# Patient Record
Sex: MALE | Race: WHITE | NOT HISPANIC OR LATINO | Employment: STUDENT | URBAN - METROPOLITAN AREA
[De-identification: names, ages, dates, MRNs, and addresses within clinical notes are randomized per-mention and may not be internally consistent; named-entity substitution may affect disease eponyms.]

---

## 2018-12-11 ENCOUNTER — ANESTHESIA EVENT (OUTPATIENT)
Dept: SURGERY | Facility: OTHER | Age: 23
End: 2018-12-11
Payer: COMMERCIAL

## 2018-12-11 ENCOUNTER — HOSPITAL ENCOUNTER (OUTPATIENT)
Facility: OTHER | Age: 23
Discharge: HOME | End: 2018-12-11
Attending: EMERGENCY MEDICINE | Admitting: EMERGENCY MEDICINE
Payer: COMMERCIAL

## 2018-12-11 ENCOUNTER — ANESTHESIA (OUTPATIENT)
Dept: SURGERY | Facility: OTHER | Age: 23
End: 2018-12-11
Payer: COMMERCIAL

## 2018-12-11 VITALS
TEMPERATURE: 99 F | SYSTOLIC BLOOD PRESSURE: 139 MMHG | WEIGHT: 200 LBS | OXYGEN SATURATION: 100 % | RESPIRATION RATE: 16 BRPM | BODY MASS INDEX: 24.36 KG/M2 | DIASTOLIC BLOOD PRESSURE: 83 MMHG | HEART RATE: 74 BPM | HEIGHT: 76 IN

## 2018-12-11 DIAGNOSIS — S61.419A HAND LACERATION: ICD-10-CM

## 2018-12-11 LAB
ABO + RH BLD: NORMAL
ALBUMIN SERPL BCP-MCNC: 4.2 G/DL
ALP SERPL-CCNC: 60 U/L
ALT SERPL W/O P-5'-P-CCNC: 21 U/L
ANION GAP SERPL CALC-SCNC: 7 MMOL/L
AST SERPL-CCNC: 21 U/L
BASOPHILS # BLD AUTO: 0.02 K/UL
BASOPHILS NFR BLD: 0.4 %
BILIRUB SERPL-MCNC: 0.9 MG/DL
BLD GP AB SCN CELLS X3 SERPL QL: NORMAL
BUN SERPL-MCNC: 14 MG/DL
CALCIUM SERPL-MCNC: 9.9 MG/DL
CHLORIDE SERPL-SCNC: 106 MMOL/L
CO2 SERPL-SCNC: 27 MMOL/L
CREAT SERPL-MCNC: 0.9 MG/DL
DIFFERENTIAL METHOD: NORMAL
EOSINOPHIL # BLD AUTO: 0.1 K/UL
EOSINOPHIL NFR BLD: 1.3 %
ERYTHROCYTE [DISTWIDTH] IN BLOOD BY AUTOMATED COUNT: 12.5 %
EST. GFR  (AFRICAN AMERICAN): >60 ML/MIN/1.73 M^2
EST. GFR  (NON AFRICAN AMERICAN): >60 ML/MIN/1.73 M^2
GLUCOSE SERPL-MCNC: 102 MG/DL
HCT VFR BLD AUTO: 42 %
HGB BLD-MCNC: 14 G/DL
LYMPHOCYTES # BLD AUTO: 1.2 K/UL
LYMPHOCYTES NFR BLD: 20.9 %
MCH RBC QN AUTO: 29.5 PG
MCHC RBC AUTO-ENTMCNC: 33.3 G/DL
MCV RBC AUTO: 89 FL
MONOCYTES # BLD AUTO: 0.4 K/UL
MONOCYTES NFR BLD: 7.9 %
NEUTROPHILS # BLD AUTO: 3.9 K/UL
NEUTROPHILS NFR BLD: 69.3 %
PLATELET # BLD AUTO: 236 K/UL
PMV BLD AUTO: 9.9 FL
POTASSIUM SERPL-SCNC: 4.3 MMOL/L
PROT SERPL-MCNC: 7.2 G/DL
RBC # BLD AUTO: 4.74 M/UL
SODIUM SERPL-SCNC: 140 MMOL/L
WBC # BLD AUTO: 5.56 K/UL

## 2018-12-11 PROCEDURE — 36000706: Performed by: ORTHOPAEDIC SURGERY

## 2018-12-11 PROCEDURE — 63600175 PHARM REV CODE 636 W HCPCS: Performed by: NURSE ANESTHETIST, CERTIFIED REGISTERED

## 2018-12-11 PROCEDURE — 96365 THER/PROPH/DIAG IV INF INIT: CPT

## 2018-12-11 PROCEDURE — 80053 COMPREHEN METABOLIC PANEL: CPT

## 2018-12-11 PROCEDURE — 63600175 PHARM REV CODE 636 W HCPCS: Performed by: EMERGENCY MEDICINE

## 2018-12-11 PROCEDURE — 36415 COLL VENOUS BLD VENIPUNCTURE: CPT

## 2018-12-11 PROCEDURE — 99285 EMERGENCY DEPT VISIT HI MDM: CPT | Mod: 25

## 2018-12-11 PROCEDURE — 25000003 PHARM REV CODE 250: Performed by: NURSE ANESTHETIST, CERTIFIED REGISTERED

## 2018-12-11 PROCEDURE — 36000707: Performed by: ORTHOPAEDIC SURGERY

## 2018-12-11 PROCEDURE — 85025 COMPLETE CBC W/AUTO DIFF WBC: CPT

## 2018-12-11 PROCEDURE — 37000008 HC ANESTHESIA 1ST 15 MINUTES: Performed by: ORTHOPAEDIC SURGERY

## 2018-12-11 PROCEDURE — 71000015 HC POSTOP RECOV 1ST HR: Performed by: ORTHOPAEDIC SURGERY

## 2018-12-11 PROCEDURE — 37000009 HC ANESTHESIA EA ADD 15 MINS: Performed by: ORTHOPAEDIC SURGERY

## 2018-12-11 PROCEDURE — 12041 INTMD RPR N-HF/GENIT 2.5CM/<: CPT | Mod: F3

## 2018-12-11 PROCEDURE — 86901 BLOOD TYPING SEROLOGIC RH(D): CPT

## 2018-12-11 PROCEDURE — G0378 HOSPITAL OBSERVATION PER HR: HCPCS

## 2018-12-11 PROCEDURE — 96367 TX/PROPH/DG ADDL SEQ IV INF: CPT

## 2018-12-11 PROCEDURE — 25000003 PHARM REV CODE 250: Performed by: PHYSICIAN ASSISTANT

## 2018-12-11 PROCEDURE — 27800903 OPTIME MED/SURG SUP & DEVICES OTHER IMPLANTS: Performed by: ORTHOPAEDIC SURGERY

## 2018-12-11 PROCEDURE — 63600175 PHARM REV CODE 636 W HCPCS: Performed by: ANESTHESIOLOGY

## 2018-12-11 DEVICE — IMPLANTABLE DEVICE: Type: IMPLANTABLE DEVICE | Site: HAND | Status: FUNCTIONAL

## 2018-12-11 RX ORDER — SODIUM CHLORIDE, SODIUM LACTATE, POTASSIUM CHLORIDE, CALCIUM CHLORIDE 600; 310; 30; 20 MG/100ML; MG/100ML; MG/100ML; MG/100ML
INJECTION, SOLUTION INTRAVENOUS CONTINUOUS PRN
Status: DISCONTINUED | OUTPATIENT
Start: 2018-12-11 | End: 2018-12-11

## 2018-12-11 RX ORDER — CEPHALEXIN 500 MG/1
500 CAPSULE ORAL 4 TIMES DAILY
Qty: 20 CAPSULE | Refills: 0 | Status: SHIPPED | OUTPATIENT
Start: 2018-12-11 | End: 2018-12-16

## 2018-12-11 RX ORDER — OXYCODONE HYDROCHLORIDE 5 MG/1
5 TABLET ORAL
Status: DISCONTINUED | OUTPATIENT
Start: 2018-12-11 | End: 2018-12-11 | Stop reason: HOSPADM

## 2018-12-11 RX ORDER — FENTANYL CITRATE 50 UG/ML
INJECTION, SOLUTION INTRAMUSCULAR; INTRAVENOUS
Status: DISCONTINUED | OUTPATIENT
Start: 2018-12-11 | End: 2018-12-11

## 2018-12-11 RX ORDER — PROPOFOL 10 MG/ML
VIAL (ML) INTRAVENOUS CONTINUOUS PRN
Status: DISCONTINUED | OUTPATIENT
Start: 2018-12-11 | End: 2018-12-11

## 2018-12-11 RX ORDER — HYDROMORPHONE HYDROCHLORIDE 2 MG/ML
0.4 INJECTION, SOLUTION INTRAMUSCULAR; INTRAVENOUS; SUBCUTANEOUS EVERY 5 MIN PRN
Status: DISCONTINUED | OUTPATIENT
Start: 2018-12-11 | End: 2018-12-11 | Stop reason: HOSPADM

## 2018-12-11 RX ORDER — LIDOCAINE HCL/PF 100 MG/5ML
SYRINGE (ML) INTRAVENOUS
Status: DISCONTINUED | OUTPATIENT
Start: 2018-12-11 | End: 2018-12-11

## 2018-12-11 RX ORDER — ONDANSETRON 2 MG/ML
4 INJECTION INTRAMUSCULAR; INTRAVENOUS DAILY PRN
Status: DISCONTINUED | OUTPATIENT
Start: 2018-12-11 | End: 2018-12-11 | Stop reason: HOSPADM

## 2018-12-11 RX ORDER — MIDAZOLAM HYDROCHLORIDE 1 MG/ML
2 INJECTION INTRAMUSCULAR; INTRAVENOUS
Status: DISCONTINUED | OUTPATIENT
Start: 2018-12-11 | End: 2018-12-11 | Stop reason: HOSPADM

## 2018-12-11 RX ORDER — DIPHENHYDRAMINE HYDROCHLORIDE 50 MG/ML
12.5 INJECTION INTRAMUSCULAR; INTRAVENOUS EVERY 30 MIN PRN
Status: DISCONTINUED | OUTPATIENT
Start: 2018-12-11 | End: 2018-12-11

## 2018-12-11 RX ORDER — HYDROCODONE BITARTRATE AND ACETAMINOPHEN 5; 325 MG/1; MG/1
1 TABLET ORAL EVERY 4 HOURS PRN
Status: DISCONTINUED | OUTPATIENT
Start: 2018-12-11 | End: 2018-12-11 | Stop reason: HOSPADM

## 2018-12-11 RX ORDER — ACETAMINOPHEN 10 MG/ML
1000 INJECTION, SOLUTION INTRAVENOUS EVERY 8 HOURS
Status: DISCONTINUED | OUTPATIENT
Start: 2018-12-11 | End: 2018-12-11

## 2018-12-11 RX ORDER — ROPIVACAINE HYDROCHLORIDE 5 MG/ML
INJECTION, SOLUTION EPIDURAL; INFILTRATION; PERINEURAL
Status: COMPLETED | OUTPATIENT
Start: 2018-12-11 | End: 2018-12-11

## 2018-12-11 RX ORDER — MEPERIDINE HYDROCHLORIDE 50 MG/ML
12.5 INJECTION INTRAMUSCULAR; INTRAVENOUS; SUBCUTANEOUS ONCE AS NEEDED
Status: DISCONTINUED | OUTPATIENT
Start: 2018-12-11 | End: 2018-12-11 | Stop reason: HOSPADM

## 2018-12-11 RX ORDER — HYDROCODONE BITARTRATE AND ACETAMINOPHEN 5; 325 MG/1; MG/1
1 TABLET ORAL EVERY 4 HOURS PRN
Qty: 20 TABLET | Refills: 0 | Status: SHIPPED | OUTPATIENT
Start: 2018-12-11

## 2018-12-11 RX ORDER — ACETAMINOPHEN 10 MG/ML
1000 INJECTION, SOLUTION INTRAVENOUS EVERY 8 HOURS
Status: DISCONTINUED | OUTPATIENT
Start: 2018-12-11 | End: 2018-12-11 | Stop reason: HOSPADM

## 2018-12-11 RX ORDER — DIPHENHYDRAMINE HCL 25 MG
CAPSULE ORAL
Status: DISCONTINUED | OUTPATIENT
Start: 2018-12-11 | End: 2018-12-11

## 2018-12-11 RX ORDER — CEFAZOLIN SODIUM 1 G/50ML
1 SOLUTION INTRAVENOUS
Status: COMPLETED | OUTPATIENT
Start: 2018-12-11 | End: 2018-12-11

## 2018-12-11 RX ORDER — FENTANYL CITRATE 50 UG/ML
100 INJECTION, SOLUTION INTRAMUSCULAR; INTRAVENOUS EVERY 5 MIN PRN
Status: DISCONTINUED | OUTPATIENT
Start: 2018-12-11 | End: 2018-12-11 | Stop reason: HOSPADM

## 2018-12-11 RX ORDER — SODIUM CHLORIDE 9 MG/ML
1000 INJECTION, SOLUTION INTRAVENOUS
Status: COMPLETED | OUTPATIENT
Start: 2018-12-11 | End: 2018-12-11

## 2018-12-11 RX ORDER — MIDAZOLAM HYDROCHLORIDE 1 MG/ML
INJECTION INTRAMUSCULAR; INTRAVENOUS
Status: DISCONTINUED | OUTPATIENT
Start: 2018-12-11 | End: 2018-12-11

## 2018-12-11 RX ORDER — SODIUM CHLORIDE 0.9 % (FLUSH) 0.9 %
3 SYRINGE (ML) INJECTION
Status: DISCONTINUED | OUTPATIENT
Start: 2018-12-11 | End: 2018-12-11 | Stop reason: HOSPADM

## 2018-12-11 RX ORDER — FENTANYL CITRATE 50 UG/ML
25 INJECTION, SOLUTION INTRAMUSCULAR; INTRAVENOUS EVERY 5 MIN PRN
Status: DISCONTINUED | OUTPATIENT
Start: 2018-12-11 | End: 2018-12-11 | Stop reason: HOSPADM

## 2018-12-11 RX ORDER — MUPIROCIN 20 MG/G
1 OINTMENT TOPICAL
Status: COMPLETED | OUTPATIENT
Start: 2018-12-11 | End: 2018-12-11

## 2018-12-11 RX ADMIN — CEFAZOLIN SODIUM 1 G: 1 SOLUTION INTRAVENOUS at 11:12

## 2018-12-11 RX ADMIN — PROPOFOL 100 MCG/KG/MIN: 10 INJECTION, EMULSION INTRAVENOUS at 03:12

## 2018-12-11 RX ADMIN — SODIUM CHLORIDE, SODIUM LACTATE, POTASSIUM CHLORIDE, AND CALCIUM CHLORIDE: 600; 310; 30; 20 INJECTION, SOLUTION INTRAVENOUS at 03:12

## 2018-12-11 RX ADMIN — FENTANYL CITRATE 100 MCG: 50 INJECTION, SOLUTION INTRAMUSCULAR; INTRAVENOUS at 03:12

## 2018-12-11 RX ADMIN — MUPIROCIN 22 G: 20 OINTMENT TOPICAL at 11:12

## 2018-12-11 RX ADMIN — FENTANYL CITRATE 50 MCG: 50 INJECTION, SOLUTION INTRAMUSCULAR; INTRAVENOUS at 03:12

## 2018-12-11 RX ADMIN — MIDAZOLAM HYDROCHLORIDE 2 MG: 1 INJECTION, SOLUTION INTRAMUSCULAR; INTRAVENOUS at 03:12

## 2018-12-11 RX ADMIN — MIDAZOLAM HYDROCHLORIDE 1 MG: 1 INJECTION, SOLUTION INTRAMUSCULAR; INTRAVENOUS at 03:12

## 2018-12-11 RX ADMIN — DIPHENHYDRAMINE HYDROCHLORIDE 25 MG: 25 CAPSULE ORAL at 04:12

## 2018-12-11 RX ADMIN — SODIUM CHLORIDE 1000 ML: 0.9 INJECTION, SOLUTION INTRAVENOUS at 11:12

## 2018-12-11 RX ADMIN — LIDOCAINE HYDROCHLORIDE 100 MG: 20 INJECTION, SOLUTION INTRAVENOUS at 03:12

## 2018-12-11 RX ADMIN — ROPIVACAINE HYDROCHLORIDE 30 ML: 5 INJECTION, SOLUTION EPIDURAL; INFILTRATION; PERINEURAL at 03:12

## 2018-12-11 RX ADMIN — ACETAMINOPHEN 1000 MG: 10 INJECTION, SOLUTION INTRAVENOUS at 01:12

## 2018-12-11 NOTE — ANESTHESIA PREPROCEDURE EVALUATION
12/11/2018  Truong Vasquez V is a 23 y.o., male.    Anesthesia Evaluation    I have reviewed the Patient Summary Reports.    I have reviewed the Nursing Notes.   I have reviewed the Medications.     Review of Systems  Anesthesia Hx:  Neg history of prior surgery. Denies Family Hx of Anesthesia complications.   Denies Personal Hx of Anesthesia complications.   Social:  Non-Smoker    Hematology/Oncology:  Hematology Normal   Oncology Normal     EENT/Dental:EENT/Dental Normal   Cardiovascular:  Cardiovascular Normal Exercise tolerance: good     Pulmonary:  Pulmonary Normal    Renal/:  Renal/ Normal     Hepatic/GI:  Hepatic/GI Normal    Musculoskeletal:  Musculoskeletal Normal    Neurological:  Neurology Normal    Endocrine:  Endocrine Normal    Dermatological:  Skin Normal    Psych:  Psychiatric Normal           Physical Exam  General:  Well nourished      Dental:  Dental Findings: In tact        Mental Status:  Mental Status Findings:  Cooperative, Alert and Oriented         Anesthesia Plan  Type of Anesthesia, risks & benefits discussed:  Anesthesia Type:  general  Patient's Preference:   Intra-op Monitoring Plan: standard ASA monitors  Intra-op Monitoring Plan Comments:   Post Op Pain Control Plan:   Post Op Pain Control Plan Comments:   Induction:   IV  Beta Blocker:         Informed Consent: Patient understands risks and agrees with Anesthesia plan.  Questions answered. Anesthesia consent signed with patient.  ASA Score: 1     Day of Surgery Review of History & Physical:    H&P update referred to the surgeon.         Ready For Surgery From Anesthesia Perspective.

## 2018-12-11 NOTE — LETTER
December 11, 2018         [address]  [place] [address]  Phone: [phone]  Fax: [phone]       Patient: Truong Vasquez   YOB: 1995  Date of Visit: 12/11/2018    To Whom It May Concern:    Rsoio Vasquez  was at [facility] on 12/11/2018. He may return to school on Wednesday with a restriction of no typing.  If you have any further questions please call Dr. Mendez at [phone]

## 2018-12-11 NOTE — ED NOTES
Dr. Justice at bedside. Pt reports feeling more anxious. Refuses pain medication or something for anxiety at this time.

## 2018-12-11 NOTE — ED NOTES
"Rounding completed on pt. Tingling noted to first and second digits left hand, pt states "i think the lido is wearing off". ROM intact. Cap refill <3 sec. 2+ left radial pulse.   "

## 2018-12-11 NOTE — ANESTHESIA POSTPROCEDURE EVALUATION
"Anesthesia Post Evaluation    Patient: Truong Vasquez V    Procedure(s) Performed: Procedure(s) (LRB):  REPAIR, TENDON, HAND (Left)  REPAIR, NERVE, HAND (Left)    Final Anesthesia Type: general  Patient location during evaluation: [facility]  Patient participation: Yes- Able to Participate  Level of consciousness: awake and alert  Post-procedure vital signs: reviewed and stable  Pain management: adequate  Airway patency: patent  PONV status at discharge: No PONV  Anesthetic complications: no      Cardiovascular status: blood pressure returned to baseline  Respiratory status: unassisted  Hydration status: euvolemic  Follow-up not needed.        Visit Vitals  BP (!) 159/98   Pulse 80   Temp 37 °C (98.6 °F)   Ht 6' 4" (1.93 m)   Wt 90.7 kg (200 lb)   SpO2 97%   BMI 24.34 kg/m²       Pain/Flores Score: Pain Rating Prior to Med Admin: 6 (12/11/2018  1:50 PM)        "

## 2018-12-11 NOTE — ED NOTES
Report given to surgery. Informed RN that pt refused to remove clothing at this time. States he would give belongings to his grandmother who will be transporting him home.

## 2018-12-11 NOTE — ED PROVIDER NOTES
Encounter Date: 12/11/2018       History     Chief Complaint   Patient presents with    Laceration     lac to thumb-aspect left palm that occured while cutting an avocado. Approx 10 min PTA. Bleeding controlled in triage. UTD on tdap. Pt admits tingling to second digit. Flexion and extension to first and second digit intact. cap refill <3 sec     Patient is a 23-year-old male no significant past medical history presents with complaints of laceration to left hand.  He reports that he was cutting an avocado this morning and accidentally cut the palm of the hand with a knife.  Reports immediate onset of pain and bleeding.  Hr wrapped the hand and came immediately to the emergency department.  Tetanus is up-to-date.  No other injury to report.  Does report progressive loss of sensation to the lateral aspect of his left index finger.  He is right hand dominant.  Currently accompanied by female significant other who is at bedside.          Review of patient's allergies indicates:  No Known Allergies  History reviewed. No pertinent past medical history.  History reviewed. No pertinent surgical history.  History reviewed. No pertinent family history.  Social History     Tobacco Use    Smoking status: Never Smoker    Smokeless tobacco: Never Used   Substance Use Topics    Alcohol use: Yes    Drug use: No     Review of Systems   Constitutional: Negative for fever.   HENT: Negative for sore throat.    Respiratory: Negative for shortness of breath.    Cardiovascular: Negative for chest pain.   Gastrointestinal: Negative for nausea.   Genitourinary: Negative for dysuria.   Musculoskeletal: Negative for back pain.   Skin: Negative for rash.        Laceration to the left hand    Neurological: Negative for weakness.   Hematological: Does not bruise/bleed easily.       Physical Exam     Initial Vitals [12/11/18 0958]   BP Pulse Resp Temp SpO2   -- -- -- 98.6 °F (37 °C) --      MAP       --         Physical Exam    Nursing note  and vitals reviewed.  Constitutional: He appears well-developed and well-nourished. He is not diaphoretic. No distress.   Healthy-appearing male in no acute distress or obvious pain does appear quite anxious during interview and exam.  Guarding his left hand during interview and exam which is wrapped with blood-stained dressing.  He makes good eye contact, speaks in clear full sentences and ambulates with ease.   HENT:   Head: Normocephalic and atraumatic.   Eyes: Conjunctivae and EOM are normal. Pupils are equal, round, and reactive to light. Right eye exhibits no discharge. Left eye exhibits no discharge. No scleral icterus.   Neck: Normal range of motion.   Cardiovascular: Normal rate, regular rhythm, normal heart sounds and intact distal pulses. Exam reveals no gallop and no friction rub.    No murmur heard.  Pulmonary/Chest: Breath sounds normal. He has no wheezes. He has no rhonchi. He has no rales.   Abdominal: Soft. Bowel sounds are normal. There is no tenderness. There is no rebound and no guarding.   Musculoskeletal: Normal range of motion. He exhibits no edema or tenderness.   Lymphadenopathy:     He has no cervical adenopathy.   Neurological: He is alert and oriented to person, place, and time. He has normal strength. No cranial nerve deficit or sensory deficit. GCS score is 15. GCS eye subscore is 4. GCS verbal subscore is 5. GCS motor subscore is 6.   Skin: Skin is warm. Capillary refill takes less than 2 seconds. No rash and no abscess noted. No erythema.   Left hand has large 11 cm  [place] shaped wedge like laceration in the web spacing between thumb and index finger (non-dominant hand). There is active arterial bleed. There is evidence of shreded tending and muscle laceration. There is superficial sensation loss on the lateral aspect of the index finger. There is normal ROM and [place] with isolation of all joints. There is normal radial pulse    There is a 2cm wedge shaped laceration to the distal  end of the 4th digit on the lateral side. There is 0.5cm of finger nail involvement. There is no evidence of neurovascular or bony compromise.    Psychiatric: He has a normal mood and affect. His behavior is normal. Thought content normal.         ED Course   Lac Repair  Date/Time: 12/11/2018 11:24 AM  Performed by: Laura Godinez PA-C  Authorized by: William Justice MD   Body area: upper extremity  Location details: left ring finger  Laceration length: 2 cm  Foreign bodies: no foreign bodies  Tendon involvement: none  Nerve involvement: none  Vascular damage: no  Anesthesia: digital block    Anesthesia:  Local Anesthetic: lidocaine 1% without epinephrine  Anesthetic total: 4 mL  Patient sedated: no  Preparation: Patient was prepped and draped in the usual sterile fashion.  Irrigation solution: saline  Irrigation method: syringe  Amount of cleaning: extensive  Debridement: none  Degree of undermining: none  Skin closure: 4-0 nylon  Number of sutures: 2  Technique: simple  Approximation: close  Approximation difficulty: simple  Dressing: antibiotic ointment  Patient tolerance: Patient tolerated the procedure well with no immediate complications        Labs Reviewed - No data to display        Imaging Results          X-Ray Hand 3 view Left (Final result)  Result time 12/11/18 10:16:49    Final result by Gerald Montanez MD (12/11/18 10:16:49)                 Impression:      No evidence of acute fracture or radiopaque foreign body.      Electronically signed by: Gerald Montanez MD  Date:    12/11/2018  Time:    10:16             Narrative:    EXAMINATION:  XR HAND COMPLETE 3 VIEW LEFT    CLINICAL HISTORY:  laceration;.    TECHNIQUE:  PA, lateral, and oblique views of the left hand were performed.    COMPARISON:  None    FINDINGS:  No acute osseous findings.  No radiopaque foreign bodies.  Joint spaces are maintained.                              Labs Reviewed   COMPREHENSIVE METABOLIC PANEL - Abnormal;  Notable for the following components:       Result Value    Anion Gap 7 (*)     All other components within normal limits   CBC W/ AUTO DIFFERENTIAL   COMPREHENSIVE METABOLIC PANEL   TYPE & SCREEN          Medical Decision Making:   ED Management:  Urgent evaluation of a 23-year-old male who presents with complex laceration to the palm of the left hand requiring surgical washout and repair.  He is afebrile, nontoxic appearing, hemodynamically stable. Physical exam outlined above and reveals large 11 cm wedge-shaped laceration with extension into tendon and muscle.  There is a small compromised artery that requires figure-eight stitch in the emergency department.  Wound is irrigated and locally anesthetized for wound exploration.  Plan is to consult Orthopedic Hand surgery for surgical plan.  There is a 2nd small laceration to the distal and of the 4th finger on the same hand that is repaired with sutures.  Digital block is performed see procedure note above.    11:00 AM Dr. Justice at bedside, throws figure 8 stitch with 5.0 vycril to stop small arterial bleed. Wound cleaned and covered. He discusses case with Dr. Claude Williams who will come to see the patient at noon and plan to take the patient to the OR for surgical repair. Patient is NPO, basic labs and type and screen pending. Will give 1g Ancef and continue to monitor. Patient offered analgesia and anxiolytic and refuses. Patient is amenable to OP repair at this time. Last full meal last night, pt drank a cup of coffee with milk at 8AM.     2:36 PM 1:00PM Dr. Mendez at bedside, he is amenable to OR washout and repair. Patient ultimately requests tylenol for pain -I gave patient IV tylenol. Patient is stable for transport to OR.   Other:   I have discussed this case with another health care provider.       <> Summary of the Discussion: Jani Mendez                       Clinical Impression:   The encounter diagnosis was Hand laceration.                              Laura Godinez PA-C  12/11/18 1447

## 2018-12-11 NOTE — ANESTHESIA PROCEDURE NOTES
Peripheral Block    Patient location during procedure: holding area    Reason for block: primary anesthetic   Diagnosis: left hand pain   Start time: 12/11/2018 3:01 PM  Timeout: 12/11/2018 3:00 PM   End time: 12/11/2018 3:08 PM  Staffing  Anesthesiologist: Sunil Mark MD  Performed: anesthesiologist   Preanesthetic Checklist  Completed: patient identified, site marked, surgical consent, pre-op evaluation, timeout performed, IV checked, risks and benefits discussed and monitors and equipment checked  Peripheral Block  Patient position: supine  Prep: ChloraPrep  Patient monitoring: heart rate, cardiac monitor, continuous pulse ox, continuous capnometry and frequent blood pressure checks  Block type: supraclavicular  Laterality: left  Injection technique: single shot  Needle  Needle type: Stimuplex   Needle gauge: 21 G  Needle length: 3.5 in  Needle localization: anatomical landmarks and ultrasound guidance   -ultrasound image captured on disc.  Assessment  Injection assessment: negative aspiration, negative parasthesia and local visualized surrounding nerve  Paresthesia pain: none  Heart rate change: no  Slow fractionated injection: yes  Additional Notes  VSS.  [facility] RN monitoring vitals throughout procedure.  Patient tolerated procedure well.

## 2018-12-11 NOTE — ED NOTES
Transport en route to move patient to [facility] prior to surgery. Will send consents and pt belongings with him. NS infusing at 125ml/hr

## 2018-12-11 NOTE — BRIEF OP NOTE
[facility]  Brief Operative Note     SUMMARY     Surgery Date: 12/11/2018     Surgeon(s) and Role:     * Claude S. Williams IV, MD - Primary    Assisting Surgeon: None    Pre-op Diagnosis:  Laceration of left hand without foreign body, initial encounter [S61.412A]    Post-op Diagnosis:  Post-Op Diagnosis Codes:     * Laceration of left hand without foreign body, initial encounter [S61.412A]  Laceration of index radial digital nerve  Laceration of 1st dorsal interosseous tendon, Laceration of adductus pollicus    Procedure(s) (LRB):  REPAIR, TENDON, HAND (Left)  REPAIR, NERVE, HAND (Left)  Repair Add polliucs, Repair 1st dorsal interossous, repair index radial digital nerve  Anesthesia: General    Description of the findings of the procedure: as above    Findings/Key Components: as above    Estimated Blood Loss: * No values recorded between 12/11/2018  3:30 PM and 12/11/2018  5:22 PM *         Specimens:   Specimen (12h ago, onward)    None          Discharge Note    SUMMARY     Admit Date: 12/11/2018    Discharge Date and Time:  12/11/2018 5:25 PM    Hospital Course (synopsis of major diagnoses, care, treatment, and services provided during the course of the hospital stay): uneventful     Final Diagnosis: Post-Op Diagnosis Codes:     * Laceration of left hand without foreign body, initial encounter [S61.412A]    Disposition: Home or Self Care    Follow Up/Patient Instructions:     Medications:  Reconciled Home Medications:      Medication List      START taking these medications    cephALEXin 500 MG capsule  Commonly known as:  KEFLEX  Take 1 capsule (500 mg total) by mouth 4 (four) times daily. for 5 days     HYDROcodone-acetaminophen 5-325 mg per tablet  Commonly known as:  NORCO  Take 1 tablet by mouth every 4 (four) hours as needed for Pain.          Discharge Procedure Orders   Diet general     Call MD for:  temperature >100.4     Call MD for:  persistent nausea and vomiting     Call MD for:   severe uncontrolled pain     Call MD for:  difficulty breathing, headache or visual disturbances     Call MD for:  redness, tenderness, or signs of infection (pain, swelling, redness, odor or green/yellow discharge around incision site)     Call MD for:  hives     Call MD for:  persistent dizziness or light-headedness     Call MD for:  extreme fatigue     Leave dressing on - Keep it clean, dry, and intact until clinic visit     Keep surgical extremity elevated     Lifting restrictions     No driving, operating heavy equipment or signing legal documents while taking pain medication     Follow-up Information     Claude S. Williams Iv, MD On 12/17/2018.    Specialty:  Orthopedic Surgery  Why:  For wound re-check  Contact information:  [address]  [facility] [id number] [phone]

## 2018-12-11 NOTE — ED NOTES
Dr. Claude Williams at bedside discussing plan of care with pt and discussing surgery consent with pt. Pt signed and witnessed by RNPANCHITO.

## 2018-12-11 NOTE — ED TRIAGE NOTES
Pt reports to ED via POV c/o 4cm lac to thumb-aspect left palm that occurred while pt was cutting an avocado approx 10 min PTA. Bleeding controlled in triage. UTD on tetanus. Pt admits tingling to second digit left hand. Flexion and extension to first and second digit intact. Cap refill < 3 sec.

## 2018-12-11 NOTE — DISCHARGE INSTRUCTIONS
Anesthesia: Monitored Anesthesia Care (MAC)    Anesthesia Safety  · Have an adult family member or friend drive you home after the procedure.  · For the first 24 hours after your surgery:  ¨ Do not drive or use heavy equipment.  ¨ Do not make important decisions or sign documents.  ¨ Avoid alcohol.  ¨ Have someone stay with you, if possible. They can watch for problems and help keep you safe.      After Hand Surgery  After surgery, the better you take care of yourself--especially your hand--the sooner it will heal. Follow your surgeons instructions. Try not to bump your hand, and dont move or lift anything while youre still wearing bandages, a splint, or a cast.  Care for your hand    · Keep your hand elevated above heart level as much as possible for the first several days after surgery. This helps reduce swelling and pain.  · To help prevent infection and speed healing, take care not to get your cast or bandages wet.  Relieve pain as directed  Your surgeon may prescribe pain medicine or suggest you take an anti-inflammatory medicine. You might also be instructed to apply ice (or another cold source) to your hand. If you use ice cubes, put them in a plastic bag and rest it on top of your bandages. Leave the cold source on your hand for as long as its comfortable. Do this several times a day for the first few days after surgery. It may take several minutes before you can feel the cold through the cast or bandages.  Follow up with your surgeon  During a follow-up visit after surgery, your surgeon will check your progress. The stitches, bandages, splint, or cast may be removed. A new cast or splint may be placed. If your hand has healed enough, your surgeon may prescribe exercises.  Do prescribed hand exercises  Your surgeon may recommend that you do exercises. These may be done under the guidance of a physical or occupational therapist. The exercises strengthen your hand, help you regain flexibility, and  restore proper function. Do the exercises as advised.  Call your surgeon if you have...  · A fever higher than 100.4°F (38.0°C) taken by mouth  · Side effects from your medicine, such as prolonged nausea  · A wet or loose dressing, or a dressing that is too tight  · Excessive bleeding  · Increased, ongoing pain or numbness  · Signs of infection (such as drainage, warmth, or redness) at the incision site   Date Last Reviewed: 11/11/2015 © 2000-2017 [employer]. [address] [place] [address]. All rights reserved. This information is not intended as a substitute for professional medical care. Always follow your healthcare professional's instructions.

## 2018-12-11 NOTE — ED NOTES
Contacted pharmacy to send ancef abx. Spoke with surgery and informed last oral intake was at 0830 this morning. Last solid food was last night at 2300.

## 2018-12-12 NOTE — PLAN OF CARE
Truong Vasquez V has met all discharge criteria from Phase II. Vital Signs are stable, ambulating  without difficulty. Discharge instructions given, patient verbalized understanding. Discharged from facility via wheelchair in stable condition.

## 2018-12-19 NOTE — H&P
DATE OF ADMISSION:  12/11/2018    ADMISSION DIAGNOSIS:  Left hand laceration index finger digital nerve injury.    DISCHARGE DIAGNOSIS:  Left hand laceration index finger digital nerve injury.    HISTORY OF PRESENT ILLNESS:  Mr. Vasquez is a 23-year-old gentleman, otherwise   healthy, who presented to the Emergency Department at [facility]   with a laceration of the left hand first webspace.  This apparently occurred at   home in his kitchen when he was attempting to cut an avocado.  He had immediate   pain and bleeding and presented to the Emergency Department at [facility], where he was evaluated and the wound was cleaned.    PAST MEDICAL HISTORY:  None.    PAST SURGICAL HISTORY:  None.    ALLERGIES:  No known drug allergies.    CURRENT MEDICATIONS:  None.    SOCIAL HISTORY:  The patient does not use tobacco or alcohol.    REVIEW OF SYSTEMS:  Pertinent for left hand pain as well as some numbness.    PHYSICAL EXAMINATION:  GENERAL:  Mr. Vasquez is alert and appropriate with exam, in no apparent distress.    He is following commands well.  CHEST:  Clear breath sounds bilaterally with unlabored breathing.  HEART:  Reveals normal sinus rhythm.  EXTREMITIES:  Left hand exam reveals an 8 cm laceration through the first   webspace of the left hand.  There is some exposed muscle.  There is diminished   sensibility to the index finger.  He has intact sensibility to the thumb and   does have active flexion and extension of the sublimis and profundus tendons to   the digits and full flexion and extension of the thumb.  There is also a small   laceration of the ring finger and small finger nail.  There is less than 2   second capillary inflow throughout all digits.    X-RAYS obtained demonstrated no apparent fracture or foreign body.    IMPRESSION:  Left hand laceration with index digital nerve and palm deep   muscular injury.    PLAN:  I explained to the patient his condition and natural history and  options   were discussed, he has elected to undergo exploration, irrigation and   debridement and operative repair.  He will be administered antibiotics in the   Emergency Department where the wound was dressed.  The potential benefits as   well as risks and complications of operative intervention were reviewed and   consents were signed.  This will be arranged as soon as the Operating Room is   available.      CAROL  dd: 12/18/2018 16:27:21 (CST)  td: 12/19/2018 02:55:21 (CST)  Doc ID   #[id number]  Job ID #[id number]    CC:

## 2018-12-19 NOTE — OP NOTE
DATE OF PROCEDURE:  12/11/2018.    PREOPERATIVE DIAGNOSES:  Left hand laceration of first webspace, left hand   laceration of index finger radial digital nerve, left hand laceration of first   dorsal interosseous tendon and adductor pollicis muscle.    POSTOPERATIVE DIAGNOSES:  Left hand laceration of first webspace, left hand   laceration of index finger radial digital nerve, left hand laceration first of   dorsal interosseous tendon and adductor pollicis muscle.    PROCEDURE PERFORMED:  Left hand irrigation and debridement and exploration and   repair of a rather reconstruction of left hand index finger radial digital   nerves using AxoGen allograft nerve graft, repair of first dorsal interosseous   tendon and repair of adductor pollicis muscle.    INDICATIONS:  Mr. Vasquez is a 23-year-old gentleman who sustained an injury to   his left hand in his home in the kitchen when he was attempting to cut an   avocado.  He presented to the Emergency Department at [facility] where he   was noted to have a large laceration of the hand.  He had diminished sensibility   of the index finger with normal sensibility of the thumb.  The laceration   extended about 6 cm from the dorsum of the hand across the first webspace into   the palm.  After the potential benefits as well as risks and complications of   options were reviewed, the patient has elected to undergo the above procedure.    PROCEDURE IN DETAIL:  After proper informed consent was obtained, the patient   was transported to the Operating Suite, placed supine on the operating table.    General endotracheal anesthesia was administered per the anesthesiologist   without difficulty.  The left hand was then thoroughly prepped with alcohol,   Betadine and draped in the usual sterile fashion.  Preoperative antibiotics were   administered and routine preoperative timeout was taken and the operative site   was positively identified by the operative team.  The wound was  thoroughly   lavaged with normal saline.  After Esmarch exsanguination, a well-padded upper   arm tourniquet was elevated to 250 mmHg.  The palmar aspect of the incision was   extended few centimeters to explore the entire zone of injury.  Operative   findings are as noted above.  There was no gross contamination or foreign body   noted.  Copious lavage was further performed.  The digital nerve of the long   finger, index finger and thumb were explored.  These appeared to be intact in   the radial digital nerve of the index finger, which had about a 2 cm gap of a   laceration of the nerve.  The nerve was prepared proximally and distally   removing the distal stump to normal appearing fascicles.  The microscope was   brought in and used to perform a graft reconstruction using AxoGen allograft 3   mm.  This was prepared and was then used to bridge the gap and was coapted with   epineurial 8-0 nylon interrupted sutures proximally and distally without any   tension on full extension of the digit.  The index finger was noted to have   laxity with ulnar deviation, although the index radial collateral ligament at   the MP joint was intact.  There was a laceration of the first dorsal   interosseous, which was repaired using figure-of-eight interrupted suture.  The   adductor pollicis musculature was also reapproximated with 3-0 Vicryl   interrupted grasping sutures.  The tourniquet was then deflated and hemostasis   was confirmed.  The wound was closed with nylon interrupted suture and a sterile   soft dressing was applied and a splint was placed.  The patient was awakened in   the Operating Suite without complication and taken to the recovery area in   stable condition.  He tolerated the procedure very well.  Lap, instrument and   needle counts were correct.    BLOOD LOSS:  Minimal.    COMPLICATIONS:  None.      CAROL  dd: 12/18/2018 15:54:17 (CST)  td: 12/18/2018 21:59:12 (CST)  Doc ID   #[id number]  Job ID #[id number]    CC:

## 2021-07-01 ENCOUNTER — PATIENT MESSAGE (OUTPATIENT)
Dept: ADMINISTRATIVE | Facility: OTHER | Age: 26
End: 2021-07-01

## (undated) DEVICE — PAD CAST SPECIALIST STRL 4

## (undated) DEVICE — ALCOHOL 70% ISOP W/GREEN 16OZ

## (undated) DEVICE — SYR B-D DISP CONTROL 10CC100/C

## (undated) DEVICE — GAUZE SPONGE 4X4 12PLY

## (undated) DEVICE — SUT ETHILON 8-0 BLK MONO BV

## (undated) DEVICE — APPLICATOR CHLORAPREP ORN 26ML

## (undated) DEVICE — GLOVE BIOGEL SKINSENSE PI 7.0

## (undated) DEVICE — SUT 4/0 18IN ETHILON BL P3

## (undated) DEVICE — SUT VICRYL 4-0 27 RB-1

## (undated) DEVICE — UNDERGLOVE BIOGEL PI SZ 6.5 LF

## (undated) DEVICE — PACK UPPER EXTREMITY BAPTIST

## (undated) DEVICE — CORD FOR BIPOLAR FORCEPS 12

## (undated) DEVICE — DRESSING N ADH OIL EMUL 3X3

## (undated) DEVICE — SEE MEDLINE ITEM 146347

## (undated) DEVICE — BLADE SURG STAINLESS STEEL #15

## (undated) DEVICE — SEE MEDLINE ITEM 146373

## (undated) DEVICE — BLADE SURG STAINLESS STEEL #10

## (undated) DEVICE — SEE MEDLINE ITEM 152515

## (undated) DEVICE — UNDERGLOVES BIOGEL PI SIZE 8

## (undated) DEVICE — SUT VICRYL 3-0 27 RB-1

## (undated) DEVICE — SUT PROLENE 4-0 VB/RB-1

## (undated) DEVICE — SEE MEDLINE ITEM 152529

## (undated) DEVICE — SUT 4.0 ETHILON

## (undated) DEVICE — NDL HYPO REG 25G X 1 1/2

## (undated) DEVICE — IMMOBILIZER HAND ALUMINUM LRG

## (undated) DEVICE — SUT MONOCRYL 3-0 RB1

## (undated) DEVICE — SEE MEDLINE ITEM 146231

## (undated) DEVICE — PAD UNDERPAD 30X30

## (undated) DEVICE — GLOVE BIOGEL SKINSENSE PI 7.5

## (undated) DEVICE — SLING ARM LARGE FOAM STRAP

## (undated) DEVICE — SOL 9P NACL IRR PIC IL

## (undated) DEVICE — DRESSING XEROFORM FOIL PK 1X8

## (undated) DEVICE — GLOVE BIOGEL SKINSENSE PI 6.5